# Patient Record
Sex: FEMALE | URBAN - METROPOLITAN AREA
[De-identification: names, ages, dates, MRNs, and addresses within clinical notes are randomized per-mention and may not be internally consistent; named-entity substitution may affect disease eponyms.]

---

## 2022-12-20 ENCOUNTER — APPOINTMENT (OUTPATIENT)
Dept: URBAN - METROPOLITAN AREA CLINIC 184 | Age: 47
Setting detail: DERMATOLOGY
End: 2022-12-20

## 2022-12-20 DIAGNOSIS — L57.0 ACTINIC KERATOSIS: ICD-10-CM

## 2022-12-20 DIAGNOSIS — L57.8 OTHER SKIN CHANGES DUE TO CHRONIC EXPOSURE TO NONIONIZING RADIATION: ICD-10-CM

## 2022-12-20 DIAGNOSIS — L72.0 EPIDERMAL CYST: ICD-10-CM

## 2022-12-20 DIAGNOSIS — L81.4 OTHER MELANIN HYPERPIGMENTATION: ICD-10-CM

## 2022-12-20 DIAGNOSIS — Z85.828 PERSONAL HISTORY OF OTHER MALIGNANT NEOPLASM OF SKIN: ICD-10-CM

## 2022-12-20 DIAGNOSIS — D18.0 HEMANGIOMA: ICD-10-CM

## 2022-12-20 DIAGNOSIS — D22 MELANOCYTIC NEVI: ICD-10-CM

## 2022-12-20 DIAGNOSIS — L82.1 OTHER SEBORRHEIC KERATOSIS: ICD-10-CM

## 2022-12-20 PROBLEM — D18.01 HEMANGIOMA OF SKIN AND SUBCUTANEOUS TISSUE: Status: ACTIVE | Noted: 2022-12-20

## 2022-12-20 PROBLEM — D22.9 MELANOCYTIC NEVI, UNSPECIFIED: Status: ACTIVE | Noted: 2022-12-20

## 2022-12-20 PROCEDURE — 17003 DESTRUCT PREMALG LES 2-14: CPT

## 2022-12-20 PROCEDURE — 99213 OFFICE O/P EST LOW 20 MIN: CPT | Mod: 25

## 2022-12-20 PROCEDURE — 17000 DESTRUCT PREMALG LESION: CPT

## 2022-12-20 PROCEDURE — OTHER LIQUID NITROGEN: OTHER

## 2022-12-20 PROCEDURE — OTHER COUNSELING: OTHER

## 2022-12-20 PROCEDURE — OTHER SUNSCREEN RECOMMENDATIONS: OTHER

## 2022-12-20 ASSESSMENT — LOCATION DETAILED DESCRIPTION DERM
LOCATION DETAILED: RIGHT DISTAL PRETIBIAL REGION
LOCATION DETAILED: LEFT PROXIMAL PRETIBIAL REGION
LOCATION DETAILED: RIGHT RADIAL DORSAL HAND
LOCATION DETAILED: LEFT ULNAR DORSAL HAND
LOCATION DETAILED: RIGHT DISTAL RADIAL DORSAL FOREARM
LOCATION DETAILED: MIDDLE STERNUM
LOCATION DETAILED: RIGHT NASAL SIDEWALL
LOCATION DETAILED: RIGHT ULNAR DORSAL HAND
LOCATION DETAILED: LEFT DISTAL PRETIBIAL REGION
LOCATION DETAILED: RIGHT PROXIMAL PRETIBIAL REGION
LOCATION DETAILED: LEFT RADIAL DORSAL HAND

## 2022-12-20 ASSESSMENT — LOCATION ZONE DERM
LOCATION ZONE: TRUNK
LOCATION ZONE: LEG
LOCATION ZONE: ARM
LOCATION ZONE: HAND
LOCATION ZONE: NOSE

## 2022-12-20 ASSESSMENT — LOCATION SIMPLE DESCRIPTION DERM
LOCATION SIMPLE: CHEST
LOCATION SIMPLE: RIGHT NOSE
LOCATION SIMPLE: LEFT HAND
LOCATION SIMPLE: RIGHT FOREARM
LOCATION SIMPLE: RIGHT HAND
LOCATION SIMPLE: RIGHT PRETIBIAL REGION
LOCATION SIMPLE: LEFT PRETIBIAL REGION

## 2022-12-20 NOTE — PROCEDURE: LIQUID NITROGEN
Number Of Freeze-Thaw Cycles: 1 freeze-thaw cycle
Detail Level: Detailed
Show Applicator Variable?: Yes
Post-Care Instructions: I reviewed with the patient in detail post-care instructions. Patient is to wear sunprotection, and avoid picking at any of the treated lesions. Pt may apply Vaseline to crusted or scabbing areas.
Render Post-Care Instructions In Note?: no
Duration Of Freeze Thaw-Cycle (Seconds): 0
Consent: The patient's consent was obtained including but not limited to risks of crusting, scabbing, blistering, scarring, darker or lighter pigmentary change, recurrence, incomplete removal and infection.

## 2023-12-27 ENCOUNTER — APPOINTMENT (OUTPATIENT)
Dept: URBAN - METROPOLITAN AREA CLINIC 184 | Age: 48
Setting detail: DERMATOLOGY
End: 2023-12-27

## 2023-12-27 DIAGNOSIS — L82.1 OTHER SEBORRHEIC KERATOSIS: ICD-10-CM

## 2023-12-27 DIAGNOSIS — L57.0 ACTINIC KERATOSIS: ICD-10-CM

## 2023-12-27 DIAGNOSIS — L81.4 OTHER MELANIN HYPERPIGMENTATION: ICD-10-CM

## 2023-12-27 DIAGNOSIS — L57.8 OTHER SKIN CHANGES DUE TO CHRONIC EXPOSURE TO NONIONIZING RADIATION: ICD-10-CM

## 2023-12-27 DIAGNOSIS — D22 MELANOCYTIC NEVI: ICD-10-CM

## 2023-12-27 DIAGNOSIS — Z85.828 PERSONAL HISTORY OF OTHER MALIGNANT NEOPLASM OF SKIN: ICD-10-CM

## 2023-12-27 DIAGNOSIS — L98.8 OTHER SPECIFIED DISORDERS OF THE SKIN AND SUBCUTANEOUS TISSUE: ICD-10-CM

## 2023-12-27 DIAGNOSIS — L82.0 INFLAMED SEBORRHEIC KERATOSIS: ICD-10-CM

## 2023-12-27 DIAGNOSIS — D18.0 HEMANGIOMA: ICD-10-CM

## 2023-12-27 PROBLEM — D22.5 MELANOCYTIC NEVI OF TRUNK: Status: ACTIVE | Noted: 2023-12-27

## 2023-12-27 PROBLEM — D22.39 MELANOCYTIC NEVI OF OTHER PARTS OF FACE: Status: ACTIVE | Noted: 2023-12-27

## 2023-12-27 PROBLEM — D22.61 MELANOCYTIC NEVI OF RIGHT UPPER LIMB, INCLUDING SHOULDER: Status: ACTIVE | Noted: 2023-12-27

## 2023-12-27 PROBLEM — D22.62 MELANOCYTIC NEVI OF LEFT UPPER LIMB, INCLUDING SHOULDER: Status: ACTIVE | Noted: 2023-12-27

## 2023-12-27 PROBLEM — D18.01 HEMANGIOMA OF SKIN AND SUBCUTANEOUS TISSUE: Status: ACTIVE | Noted: 2023-12-27

## 2023-12-27 PROCEDURE — 17110 DESTRUCT B9 LESION 1-14: CPT

## 2023-12-27 PROCEDURE — 17000 DESTRUCT PREMALG LESION: CPT | Mod: 59

## 2023-12-27 PROCEDURE — OTHER COUNSELING: OTHER

## 2023-12-27 PROCEDURE — OTHER TREATMENT REGIMEN: OTHER

## 2023-12-27 PROCEDURE — OTHER MIPS QUALITY: OTHER

## 2023-12-27 PROCEDURE — OTHER OBSERVATION: OTHER

## 2023-12-27 PROCEDURE — OTHER LIQUID NITROGEN: OTHER

## 2023-12-27 PROCEDURE — OTHER SUNSCREEN RECOMMENDATIONS: OTHER

## 2023-12-27 PROCEDURE — 17003 DESTRUCT PREMALG LES 2-14: CPT | Mod: 59

## 2023-12-27 PROCEDURE — 99214 OFFICE O/P EST MOD 30 MIN: CPT | Mod: 25

## 2023-12-27 PROCEDURE — OTHER PRESCRIPTION: OTHER

## 2023-12-27 RX ORDER — TRETIONIN 0.25 MG/G
CREAM TOPICAL
Qty: 45 | Refills: 2 | Status: ERX | COMMUNITY
Start: 2023-12-27

## 2023-12-27 ASSESSMENT — LOCATION DETAILED DESCRIPTION DERM
LOCATION DETAILED: LEFT AXILLARY TAIL OF BREAST
LOCATION DETAILED: INFERIOR MID FOREHEAD
LOCATION DETAILED: LEFT INFERIOR CENTRAL MALAR CHEEK
LOCATION DETAILED: NASAL DORSUM
LOCATION DETAILED: LEFT INFERIOR MEDIAL FOREHEAD
LOCATION DETAILED: RIGHT ELBOW
LOCATION DETAILED: LEFT RADIAL DORSAL HAND
LOCATION DETAILED: LEFT PROXIMAL DORSAL FOREARM
LOCATION DETAILED: RIGHT PROXIMAL POSTERIOR UPPER ARM
LOCATION DETAILED: LEFT PROXIMAL POSTERIOR UPPER ARM
LOCATION DETAILED: RIGHT MEDIAL FOREHEAD
LOCATION DETAILED: RIGHT MEDIAL SUPERIOR CHEST
LOCATION DETAILED: RIGHT INFERIOR CENTRAL MALAR CHEEK
LOCATION DETAILED: RIGHT ULNAR DORSAL HAND
LOCATION DETAILED: LEFT ULNAR DORSAL HAND
LOCATION DETAILED: RIGHT RADIAL DORSAL HAND
LOCATION DETAILED: MIDDLE STERNUM
LOCATION DETAILED: RIGHT INFERIOR MEDIAL MALAR CHEEK
LOCATION DETAILED: RIGHT RING PROXIMAL INTERPHALANGEAL JOINT
LOCATION DETAILED: UPPER STERNUM
LOCATION DETAILED: RIGHT DISTAL POSTERIOR UPPER ARM

## 2023-12-27 ASSESSMENT — LOCATION ZONE DERM
LOCATION ZONE: FINGER
LOCATION ZONE: NOSE
LOCATION ZONE: TRUNK
LOCATION ZONE: ARM
LOCATION ZONE: FACE
LOCATION ZONE: HAND

## 2023-12-27 ASSESSMENT — LOCATION SIMPLE DESCRIPTION DERM
LOCATION SIMPLE: LEFT HAND
LOCATION SIMPLE: RIGHT RING FINGER
LOCATION SIMPLE: RIGHT CHEEK
LOCATION SIMPLE: INFERIOR FOREHEAD
LOCATION SIMPLE: LEFT FOREHEAD
LOCATION SIMPLE: RIGHT POSTERIOR UPPER ARM
LOCATION SIMPLE: LEFT CHEEK
LOCATION SIMPLE: RIGHT ELBOW
LOCATION SIMPLE: LEFT POSTERIOR UPPER ARM
LOCATION SIMPLE: RIGHT HAND
LOCATION SIMPLE: NOSE
LOCATION SIMPLE: CHEST
LOCATION SIMPLE: LEFT FOREARM
LOCATION SIMPLE: LEFT BREAST
LOCATION SIMPLE: RIGHT FOREHEAD

## 2023-12-27 NOTE — PROCEDURE: OBSERVATION
Body Location Override (Optional - Billing Will Still Be Based On Selected Body Map Location If Applicable): Chest
Detail Level: Detailed
Size Of Lesion In Cm (Optional): 0

## 2023-12-27 NOTE — PROCEDURE: LIQUID NITROGEN
Show Applicator Variable?: Yes
Spray Paint Text: The liquid nitrogen was applied to the skin utilizing a spray paint frosting technique.
Duration Of Freeze Thaw-Cycle (Seconds): 5-10
Medical Necessity Information: It is in your best interest to select a reason for this procedure from the list below. All of these items fulfill various CMS LCD requirements except the new and changing color options.
Post-Care Instructions: I reviewed with the patient in detail post-care instructions. Patient is to wear sunprotection, and avoid picking at any of the treated lesions. Pt may apply Vaseline to crusted or scabbing areas.
Application Tool (Optional): Liquid Nitrogen Sprayer
Add 52 Modifier (Optional): no
Consent: The patient's consent was obtained including but not limited to risks of crusting, scabbing, blistering, scarring, darker or lighter pigmentary change, recurrence, incomplete removal and infection.
Medical Necessity Clause: This procedure was medically necessary because the lesions that were treated were:
Number Of Freeze-Thaw Cycles: 1 freeze-thaw cycle
Detail Level: Detailed
Duration Of Freeze Thaw-Cycle (Seconds): 5

## 2023-12-27 NOTE — PROCEDURE: TREATMENT REGIMEN
Plan: [facility] January to areas BID for 2 weeks to areas that are pink, red, rough or scaly. Stop early if areas crusts, scabs, weeps, or bleeds. Apply Vaseline after treatment is complete
Detail Level: Zone
Plan: Wash face with gentle cleanser, pat face dry then apply a pea sized amount of Tretinoin. Start slow, do every third day for 2 weeks, then qod for 2 weeks. Then apply nightly moisturize face on top of the Tretinoin. Start using Tretinoin cream in February after treatment of AKs

## 2024-12-31 ENCOUNTER — APPOINTMENT (OUTPATIENT)
Dept: URBAN - METROPOLITAN AREA CLINIC 184 | Age: 49
Setting detail: DERMATOLOGY
End: 2024-12-31

## 2024-12-31 VITALS — HEIGHT: 68.6 IN | WEIGHT: 220 LBS

## 2024-12-31 DIAGNOSIS — L82.1 OTHER SEBORRHEIC KERATOSIS: ICD-10-CM

## 2024-12-31 DIAGNOSIS — L81.4 OTHER MELANIN HYPERPIGMENTATION: ICD-10-CM

## 2024-12-31 DIAGNOSIS — D18.0 HEMANGIOMA: ICD-10-CM

## 2024-12-31 DIAGNOSIS — D22 MELANOCYTIC NEVI: ICD-10-CM

## 2024-12-31 DIAGNOSIS — L57.8 OTHER SKIN CHANGES DUE TO CHRONIC EXPOSURE TO NONIONIZING RADIATION: ICD-10-CM

## 2024-12-31 DIAGNOSIS — L57.0 ACTINIC KERATOSIS: ICD-10-CM

## 2024-12-31 DIAGNOSIS — Z85.828 PERSONAL HISTORY OF OTHER MALIGNANT NEOPLASM OF SKIN: ICD-10-CM

## 2024-12-31 DIAGNOSIS — L82.0 INFLAMED SEBORRHEIC KERATOSIS: ICD-10-CM

## 2024-12-31 PROBLEM — D22.9 MELANOCYTIC NEVI, UNSPECIFIED: Status: ACTIVE | Noted: 2024-12-31

## 2024-12-31 PROBLEM — D18.01 HEMANGIOMA OF SKIN AND SUBCUTANEOUS TISSUE: Status: ACTIVE | Noted: 2024-12-31

## 2024-12-31 PROCEDURE — 99213 OFFICE O/P EST LOW 20 MIN: CPT | Mod: 25

## 2024-12-31 PROCEDURE — 17110 DESTRUCT B9 LESION 1-14: CPT | Mod: 52,59

## 2024-12-31 PROCEDURE — OTHER MIPS QUALITY: OTHER

## 2024-12-31 PROCEDURE — OTHER SUNSCREEN RECOMMENDATIONS: OTHER

## 2024-12-31 PROCEDURE — OTHER COUNSELING: OTHER

## 2024-12-31 PROCEDURE — 17004 DESTROY PREMAL LESIONS 15/>: CPT

## 2024-12-31 PROCEDURE — OTHER LIQUID NITROGEN: OTHER

## 2024-12-31 ASSESSMENT — LOCATION SIMPLE DESCRIPTION DERM
LOCATION SIMPLE: LEFT UPPER ARM
LOCATION SIMPLE: LEFT HAND
LOCATION SIMPLE: CHEST
LOCATION SIMPLE: LEFT FOREARM
LOCATION SIMPLE: RIGHT FOREARM
LOCATION SIMPLE: LEFT WRIST
LOCATION SIMPLE: LEFT CHEEK
LOCATION SIMPLE: RIGHT HAND
LOCATION SIMPLE: RIGHT CHEEK
LOCATION SIMPLE: LEFT EAR

## 2024-12-31 ASSESSMENT — LOCATION DETAILED DESCRIPTION DERM
LOCATION DETAILED: LEFT VENTRAL DISTAL FOREARM
LOCATION DETAILED: LEFT DORSAL MIDDLE METACARPOPHALANGEAL JOINT
LOCATION DETAILED: LEFT RADIAL DORSAL HAND
LOCATION DETAILED: LEFT DORSAL WRIST
LOCATION DETAILED: LEFT PROXIMAL DORSAL FOREARM
LOCATION DETAILED: LEFT VENTRAL LATERAL PROXIMAL FOREARM
LOCATION DETAILED: LEFT SUPERIOR POSTERIOR HELIX
LOCATION DETAILED: LEFT CENTRAL MANDIBULAR CHEEK
LOCATION DETAILED: LEFT VENTRAL PROXIMAL FOREARM
LOCATION DETAILED: RIGHT PROXIMAL RADIAL DORSAL FOREARM
LOCATION DETAILED: RIGHT ULNAR DORSAL HAND
LOCATION DETAILED: LEFT ULNAR DORSAL HAND
LOCATION DETAILED: RIGHT CENTRAL MALAR CHEEK
LOCATION DETAILED: RIGHT PROXIMAL DORSAL FOREARM
LOCATION DETAILED: RIGHT DISTAL DORSAL FOREARM
LOCATION DETAILED: LEFT ANTERIOR DISTAL UPPER ARM
LOCATION DETAILED: MIDDLE STERNUM
LOCATION DETAILED: LEFT DORSAL INDEX METACARPOPHALANGEAL JOINT
LOCATION DETAILED: RIGHT RADIAL DORSAL HAND

## 2024-12-31 ASSESSMENT — LOCATION ZONE DERM
LOCATION ZONE: ARM
LOCATION ZONE: HAND
LOCATION ZONE: EAR
LOCATION ZONE: TRUNK
LOCATION ZONE: FACE

## 2024-12-31 NOTE — PROCEDURE: LIQUID NITROGEN
Number Of Freeze-Thaw Cycles: 1 freeze-thaw cycle
Detail Level: Detailed
Consent: The patient's consent was obtained including but not limited to risks of crusting, scabbing, blistering, scarring, darker or lighter pigmentary change, recurrence, incomplete removal and infection.
Post-Care Instructions: I reviewed with the patient in detail post-care instructions. Patient is to wear sunprotection, and avoid picking at any of the treated lesions. Pt may apply Vaseline to crusted or scabbing areas.
Duration Of Freeze Thaw-Cycle (Seconds): 5
Render Note In Bullet Format When Appropriate: No
Application Tool (Optional): Liquid Nitrogen Sprayer
Show Aperture Variable?: Yes
Spray Paint Text: The liquid nitrogen was applied to the skin utilizing a spray paint frosting technique.
Duration Of Freeze Thaw-Cycle (Seconds): 5-10
Medical Necessity Information: It is in your best interest to select a reason for this procedure from the list below. All of these items fulfill various CMS LCD requirements except the new and changing color options.
Medical Necessity Clause: This procedure was medically necessary because the lesions that were treated were:

## 2025-08-21 ENCOUNTER — APPOINTMENT (OUTPATIENT)
Dept: URBAN - METROPOLITAN AREA CLINIC 184 | Age: 50
Setting detail: DERMATOLOGY
End: 2025-08-21

## 2025-08-21 DIAGNOSIS — L57.8 OTHER SKIN CHANGES DUE TO CHRONIC EXPOSURE TO NONIONIZING RADIATION: ICD-10-CM

## 2025-08-21 DIAGNOSIS — L57.0 ACTINIC KERATOSIS: ICD-10-CM

## 2025-08-21 PROCEDURE — OTHER MIPS QUALITY: OTHER

## 2025-08-21 PROCEDURE — OTHER SUNSCREEN RECOMMENDATIONS: OTHER

## 2025-08-21 PROCEDURE — 17004 DESTROY PREMAL LESIONS 15/>: CPT

## 2025-08-21 PROCEDURE — OTHER ADDITIONAL NOTES: OTHER

## 2025-08-21 PROCEDURE — OTHER LIQUID NITROGEN: OTHER

## 2025-08-21 PROCEDURE — OTHER PRESCRIPTION: OTHER

## 2025-08-21 PROCEDURE — OTHER COUNSELING: OTHER

## 2025-08-21 PROCEDURE — 99213 OFFICE O/P EST LOW 20 MIN: CPT | Mod: 25

## 2025-08-21 RX ORDER — FLUOROURACIL 5 MG/G
CREAM TOPICAL
Qty: 40 | Refills: 4 | Status: ERX | COMMUNITY
Start: 2025-08-21

## 2025-08-21 ASSESSMENT — LOCATION SIMPLE DESCRIPTION DERM
LOCATION SIMPLE: RIGHT HAND
LOCATION SIMPLE: LEFT ANKLE
LOCATION SIMPLE: RIGHT CHEEK
LOCATION SIMPLE: LEFT EAR
LOCATION SIMPLE: RIGHT FOREARM
LOCATION SIMPLE: LEFT FOREARM
LOCATION SIMPLE: CHEST
LOCATION SIMPLE: LEFT WRIST
LOCATION SIMPLE: LEFT HAND

## 2025-08-21 ASSESSMENT — LOCATION ZONE DERM
LOCATION ZONE: FACE
LOCATION ZONE: LEG
LOCATION ZONE: ARM
LOCATION ZONE: EAR
LOCATION ZONE: HAND
LOCATION ZONE: TRUNK

## 2025-08-21 ASSESSMENT — LOCATION DETAILED DESCRIPTION DERM
LOCATION DETAILED: LEFT VENTRAL LATERAL DISTAL FOREARM
LOCATION DETAILED: MIDDLE STERNUM
LOCATION DETAILED: LEFT DORSAL RING FINGER METACARPOPHALANGEAL JOINT
LOCATION DETAILED: RIGHT PROXIMAL DORSAL FOREARM
LOCATION DETAILED: RIGHT DISTAL DORSAL FOREARM
LOCATION DETAILED: LEFT DISTAL DORSAL FOREARM
LOCATION DETAILED: RIGHT INFERIOR PREAURICULAR CHEEK
LOCATION DETAILED: RIGHT RADIAL DORSAL HAND
LOCATION DETAILED: LEFT PROXIMAL DORSAL FOREARM
LOCATION DETAILED: LEFT RADIAL DORSAL HAND
LOCATION DETAILED: LEFT ULNAR DORSAL HAND
LOCATION DETAILED: LEFT DORSAL WRIST
LOCATION DETAILED: LEFT POSTERIOR ANKLE
LOCATION DETAILED: LEFT SUPERIOR POSTERIOR HELIX